# Patient Record
Sex: MALE | Race: WHITE | Employment: OTHER | URBAN - METROPOLITAN AREA
[De-identification: names, ages, dates, MRNs, and addresses within clinical notes are randomized per-mention and may not be internally consistent; named-entity substitution may affect disease eponyms.]

---

## 2017-09-22 ENCOUNTER — OFFICE VISIT (OUTPATIENT)
Dept: URGENT CARE | Facility: PHYSICIAN GROUP | Age: 82
End: 2017-09-22
Payer: MEDICARE

## 2017-09-22 ENCOUNTER — HOSPITAL ENCOUNTER (EMERGENCY)
Facility: MEDICAL CENTER | Age: 82
End: 2017-09-22
Attending: EMERGENCY MEDICINE
Payer: MEDICARE

## 2017-09-22 ENCOUNTER — APPOINTMENT (OUTPATIENT)
Dept: RADIOLOGY | Facility: MEDICAL CENTER | Age: 82
End: 2017-09-22
Attending: EMERGENCY MEDICINE
Payer: MEDICARE

## 2017-09-22 VITALS
OXYGEN SATURATION: 95 % | SYSTOLIC BLOOD PRESSURE: 148 MMHG | HEART RATE: 76 BPM | RESPIRATION RATE: 16 BRPM | DIASTOLIC BLOOD PRESSURE: 70 MMHG | TEMPERATURE: 96.9 F | WEIGHT: 150.57 LBS | HEIGHT: 68 IN | BODY MASS INDEX: 22.82 KG/M2

## 2017-09-22 VITALS
HEIGHT: 68 IN | HEART RATE: 80 BPM | SYSTOLIC BLOOD PRESSURE: 140 MMHG | RESPIRATION RATE: 16 BRPM | BODY MASS INDEX: 22.73 KG/M2 | WEIGHT: 150 LBS | DIASTOLIC BLOOD PRESSURE: 80 MMHG | TEMPERATURE: 97.4 F | OXYGEN SATURATION: 94 %

## 2017-09-22 DIAGNOSIS — F41.8 SITUATIONAL ANXIETY: ICD-10-CM

## 2017-09-22 DIAGNOSIS — S64.40XA DIGITAL NERVE LACERATION, FINGER, INITIAL ENCOUNTER: ICD-10-CM

## 2017-09-22 DIAGNOSIS — S61.213A LACERATION OF LEFT MIDDLE FINGER: ICD-10-CM

## 2017-09-22 DIAGNOSIS — S61.422A LACERATION OF LEFT HAND WITH FOREIGN BODY, INITIAL ENCOUNTER: ICD-10-CM

## 2017-09-22 DIAGNOSIS — S61.211A LACERATION OF LEFT INDEX FINGER: ICD-10-CM

## 2017-09-22 PROCEDURE — 73130 X-RAY EXAM OF HAND: CPT | Mod: LT

## 2017-09-22 PROCEDURE — A9270 NON-COVERED ITEM OR SERVICE: HCPCS | Performed by: EMERGENCY MEDICINE

## 2017-09-22 PROCEDURE — 302874 HCHG BANDAGE ACE 2 OR 3""

## 2017-09-22 PROCEDURE — 99284 EMERGENCY DEPT VISIT MOD MDM: CPT

## 2017-09-22 PROCEDURE — 700102 HCHG RX REV CODE 250 W/ 637 OVERRIDE(OP): Performed by: EMERGENCY MEDICINE

## 2017-09-22 PROCEDURE — 29125 APPL SHORT ARM SPLINT STATIC: CPT

## 2017-09-22 PROCEDURE — 304999 HCHG REPAIR-SIMPLE/INTERMED LEVEL 1

## 2017-09-22 PROCEDURE — 90715 TDAP VACCINE 7 YRS/> IM: CPT | Performed by: EMERGENCY MEDICINE

## 2017-09-22 PROCEDURE — 700101 HCHG RX REV CODE 250: Performed by: EMERGENCY MEDICINE

## 2017-09-22 PROCEDURE — 303747 HCHG EXTRA SUTURE

## 2017-09-22 PROCEDURE — 700111 HCHG RX REV CODE 636 W/ 250 OVERRIDE (IP): Performed by: EMERGENCY MEDICINE

## 2017-09-22 PROCEDURE — 99215 OFFICE O/P EST HI 40 MIN: CPT | Performed by: NURSE PRACTITIONER

## 2017-09-22 PROCEDURE — 90471 IMMUNIZATION ADMIN: CPT

## 2017-09-22 RX ORDER — CEPHALEXIN 500 MG/1
500 CAPSULE ORAL 4 TIMES DAILY
Qty: 28 CAP | Refills: 0 | Status: SHIPPED | OUTPATIENT
Start: 2017-09-22 | End: 2017-09-29

## 2017-09-22 RX ORDER — CEPHALEXIN 500 MG/1
500 CAPSULE ORAL 4 TIMES DAILY
Qty: 28 CAP | Refills: 0 | Status: SHIPPED | OUTPATIENT
Start: 2017-09-22 | End: 2017-09-22

## 2017-09-22 RX ORDER — BACITRACIN ZINC AND POLYMYXIN B SULFATE 500; 10000 [USP'U]/G; [USP'U]/G
OINTMENT OPHTHALMIC
Status: COMPLETED
Start: 2017-09-22 | End: 2017-09-22

## 2017-09-22 RX ORDER — CEPHALEXIN 500 MG/1
500 CAPSULE ORAL ONCE
Status: COMPLETED | OUTPATIENT
Start: 2017-09-22 | End: 2017-09-22

## 2017-09-22 RX ORDER — LIDOCAINE HYDROCHLORIDE 10 MG/ML
20 INJECTION, SOLUTION INFILTRATION; PERINEURAL ONCE
Status: COMPLETED | OUTPATIENT
Start: 2017-09-22 | End: 2017-09-22

## 2017-09-22 RX ADMIN — CEPHALEXIN 500 MG: 500 CAPSULE ORAL at 14:17

## 2017-09-22 RX ADMIN — CLOSTRIDIUM TETANI TOXOID ANTIGEN (FORMALDEHYDE INACTIVATED), CORYNEBACTERIUM DIPHTHERIAE TOXOID ANTIGEN (FORMALDEHYDE INACTIVATED), BORDETELLA PERTUSSIS TOXOID ANTIGEN (GLUTARALDEHYDE INACTIVATED), BORDETELLA PERTUSSIS FILAMENTOUS HEMAGGLUTININ ANTIGEN (FORMALDEHYDE INACTIVATED), BORDETELLA PERTUSSIS PERTACTIN ANTIGEN, AND BORDETELLA PERTUSSIS FIMBRIAE 2/3 ANTIGEN 0.5 ML: 5; 2; 2.5; 5; 3; 5 INJECTION, SUSPENSION INTRAMUSCULAR at 14:16

## 2017-09-22 RX ADMIN — LIDOCAINE HYDROCHLORIDE 20 ML: 10 INJECTION, SOLUTION INFILTRATION; PERINEURAL at 12:59

## 2017-09-22 ASSESSMENT — ENCOUNTER SYMPTOMS
SENSORY CHANGE: 0
MYALGIAS: 1
BRUISES/BLEEDS EASILY: 0
TINGLING: 0
CHILLS: 0
WEAKNESS: 0
FEVER: 0

## 2017-09-22 ASSESSMENT — PAIN SCALES - GENERAL: PAINLEVEL_OUTOF10: 2

## 2017-09-22 NOTE — ED NOTES
Tech applied splint, wrapped stitches. Pt instructed on how to keep clean and look for signs of infection.

## 2017-09-22 NOTE — ED NOTES
Discharge instructions given to pt including follow up w/plastics in 5 days.  Educated to keep splint dry and intact and to check on circulation. Pt able to wiggle fingers.  Questions answered by RN. Prescription for antibiotics given to pt. No new complaints made. Discharged accompanied by wife.

## 2017-09-22 NOTE — ED NOTES
Ambulates to triage  Chief Complaint   Patient presents with   • Facial Laceration     L hand pointer finger, cut it on a piece of sheet metal, tetanus shot UTD

## 2017-09-22 NOTE — PROGRESS NOTES
Subjective:      Jason Manning is a 82 y.o. male who presents with Laceration (L. index and middle finger laceration )            HPI  Jason is a 82 year old male who is here for laceration of left middle and ringer finger today. Wife present. States was cutting sheet metal without gloves. States no blood thinner use. States UTD with tetanus but unable to states when he received it.    PMH:  has no past medical history of ASTHMA; Diabetes; Heart attack; Hypertension; or Stroke.  MEDS:   Current Outpatient Prescriptions:   •  ciprofloxacin (CIPRO) 500 MG Tab, Take 1 Tab by mouth 2 times a day., Disp: 28 Tab, Rfl: 0  •  ergocalciferol (DRISDOL) 38064 UNIT capsule, Take one cap PO three days per week., Disp: 12 Cap, Rfl: 2  •  hydrocodone/acetaminophen (NORCO)  MG Tab, Take 1 Tab by mouth every 6 hours as needed., Disp: 120 Tab, Rfl: 0  ALLERGIES: No Known Allergies  SURGHX:   Past Surgical History:   Procedure Laterality Date   • HERNIA REPAIR       SOCHX:  reports that he has never smoked. He does not have any smokeless tobacco history on file. He reports that he does not drink alcohol.  FH: Family history was reviewed, no pertinent findings to report    Review of Systems   Constitutional: Negative for chills, fever and malaise/fatigue.   Musculoskeletal: Positive for myalgias.   Neurological: Negative for tingling, sensory change and weakness.   Endo/Heme/Allergies: Does not bruise/bleed easily.   All other systems reviewed and are negative.         Objective:     There were no vitals taken for this visit.     Physical Exam   Constitutional: He is oriented to person, place, and time. Vital signs are normal. He appears well-developed and well-nourished. He is active and cooperative.  Non-toxic appearance. He does not have a sickly appearance. He does not appear ill. No distress.   HENT:   Head: Normocephalic.   Eyes: Conjunctivae and EOM are normal. Pupils are equal, round, and reactive to light.   Neck:  Normal range of motion. Neck supple.   Cardiovascular: Normal rate.    Pulmonary/Chest: Effort normal.   Neurological: He is alert and oriented to person, place, and time.   Skin: Skin is warm and dry. Laceration noted. No abrasion, no bruising, no ecchymosis and no rash noted. He is not diaphoretic. No erythema.   Full thickness laceration, clean cut straight across left index finger from medial to lateral aspect of ventral side just below first distal joint. No pulsating blood but has steady stream of blood flow out of laceration, unable to visualize damage. Left middle finger has laceration with same respects to index finger except no active bleeding and not as deep. No redness, swelling or debris seen in wounds. Cleaned with saline and pressure bandage to both fingers.   Vitals reviewed.              Assessment/Plan:     1. Laceration of left hand with foreign body, initial encounter    Refer patient to ER for laceration repair due to unable to stop bleeding with pressure  Patient stable, wife to drive him POV to ER

## 2017-09-22 NOTE — ED PROVIDER NOTES
"ED Provider Note    Scribed for Neal Sanz M.D. by Ellyn Simms. 9/22/2017, 12:40 PM.    Primary care provider: Bel Looney P.A.-C.  Means of arrival: Walk-in  History obtained from: Patient  History limited by: None    CHIEF COMPLAINT  Chief Complaint   Patient presents with   • Facial Laceration     L hand pointer finger, cut it on a piece of sheet metal, tetanus shot UTD       HPI  Jason Manning is a 82 y.o. male who presents to the Emergency Department for left pointer finger laceration onset prior to arrival. The patient reports developing symptoms from a piece of sheet metal. He has associated numbness. He has no past medical history and takes no daily medications. He has no allergy to medications. His tetanus shot is up to date.     REVIEW OF SYSTEMS  Review of Systems   Skin:        Left pointer finger laceration with numbness   E.     PAST MEDICAL HISTORY  The patient has no chronic medical history.     SURGICAL HISTORY   has a past surgical history that includes hernia repair.    SOCIAL HISTORY  Social History   Substance Use Topics   • Smoking status: Never Smoker   • Smokeless tobacco: Never Used   • Alcohol use No      FAMILY HISTORY  Family History   Problem Relation Age of Onset   • Non-contributory Mother    • Non-contributory Father        CURRENT MEDICATIONS  No current facility-administered medications for this encounter.     Current Outpatient Prescriptions:   •  ciprofloxacin (CIPRO) 500 MG Tab, Take 1 Tab by mouth 2 times a day., Disp: 28 Tab, Rfl: 0  •  ergocalciferol (DRISDOL) 65848 UNIT capsule, Take one cap PO three days per week., Disp: 12 Cap, Rfl: 2  •  hydrocodone/acetaminophen (NORCO)  MG Tab, Take 1 Tab by mouth every 6 hours as needed., Disp: 120 Tab, Rfl: 0     ALLERGIES  No Known Allergies    PHYSICAL EXAM  VITAL SIGNS: /77   Pulse 78   Temp 36.1 °C (96.9 °F) (Temporal)   Resp 16   Ht 1.727 m (5' 8\")   Wt 68.3 kg (150 lb 9.2 oz)   SpO2 94%   BMI 22.89 " kg/m²   Vitals reviewed.  Constitutional: Well developed, Well nourished, No acute distress, Non-toxic appearance.   Skin: Warm, Dry.  Left has a laceration of the index finger just distal to the DIP joint.  This is a deep laceration.  On the ulnar aspect of his finger.  He has an intact digital nerve.  I suspected intact Artery.  On the radial aspect of his finger.  There is no sensation distally and he is bleeding.  I suspect he has a digital artery and nerve here.  He has immediate cap refill.  The long finger on the left hand has a laceration that is just proximal to the distal interphalangeal joint.  Normal tendon function.  Normal neurovascular exam.    Both wounds were examined in a bloodless field during closure.  The index finger laceration goes down to the tendon.  Tendon function appears to be intact.  Musculoskeletal: Left pointer finger: distal sensation in ulnar and radial aspect intact    RADIOLOGY  DX-HAND 3+ LEFT   Final Result      Soft tissue swelling left second and third fingers. No acute fracture identified.   Arthritic changes.      The radiologist's interpretation of all radiological studies have been reviewed by me.    Laceration Repair Procedure Note    Indication: Laceration    Procedure: The patient was placed in the appropriate position and The index and long finger were anesthetized with 1% lidocaine without epinephrine using a digital block.  A tourniquet is placed around the base of his left index finger to minimize the bleeding, syncope, examined and closed.  Fingers are prepped with Betadine.  Left index finger board with the above findings.  Laceration is closed with 4 interrupted 4-0 nylon sutures.  The middle finger was also prepped, draped and explored.  This does not track his deep periods close the 3 sutures.  Procedure is tolerated well.  Total repaired wound length: 3 cm.     Other Items: Suture count: 11    The patient tolerated the procedure well.    Complications:  None      COURSE & MEDICAL DECISION MAKING  Pertinent Labs & Imaging studies reviewed. (See chart for details)    12:43 PM Patient seen and examined at bedside. Discussed laceration repair with the patient, which he understands and agrees with. Ordered for DX-hand left to evaluate.     1:14 PM Performed Laceration Repair Procedure, see above note for more details. Wound care was discussed with the patient. The patient will be discharged with Keflex and should return if symptoms worsen or if new symptoms arise. The patient understands and agrees to plan.      Tetanus is up-to-date.  He is prescribed Keflex is referred to the hand surgeon.  I discussed the importance with him.        The patient will return for new or worsening symptoms and is stable at the time of discharge.    The patient is referred to a primary physician for blood pressure management, diabetic screening, and for all other preventative health concerns.    DISPOSITION:  Patient will be discharged home in stable condition.    FOLLOW UP:  Your Physician  Varies    Schedule an appointment as soon as possible for a visit in 2 days        OUTPATIENT MEDICATIONS:  New Prescriptions    CEPHALEXIN (KEFLEX) 500 MG CAP    Take 1 Cap by mouth 4 times a day for 7 days.     FINAL IMPRESSION  1. Laceration of left index finger    2. Laceration of left middle finger    3. Digital nerve laceration, finger, initial encounter    4. Situational anxiety         Ellyn DOOLEY (Scribe), am scribing for, and in the presence of, Neal Sanz M.D..    Electronically signed by: Ellyn Simms (Scribe), 9/22/2017    Neal DOOLEY M.D. personally performed the services described in this documentation, as scribed by Ellyn Simms in my presence, and it is both accurate and complete.    The note accurately reflects work and decisions made by me.  Neal Sanz  9/22/2017  3:20 PM

## 2017-09-22 NOTE — DISCHARGE INSTRUCTIONS
Follow-up with a hand doctor.  Return to ER or to return to the ER for pain, swelling, redness, or other concerns.      Laceration Care, Adult  A laceration is a cut that goes through all layers of the skin. The cut also goes into the tissue that is right under the skin. Some cuts heal on their own. Others need to be closed with stitches (sutures), staples, skin adhesive strips, or wound glue. Taking care of your cut lowers your risk of infection and helps your cut to heal better.  HOW TO TAKE CARE OF YOUR CUT  For stitches or staples:  · Keep the wound clean and dry.  · If you were given a bandage (dressing), you should change it at least one time per day or as told by your doctor. You should also change it if it gets wet or dirty.  · Keep the wound completely dry for the first 24 hours or as told by your doctor. After that time, you may take a shower or a bath. However, make sure that the wound is not soaked in water until after the stitches or staples have been removed.  · Clean the wound one time each day or as told by your doctor:  ¨ Wash the wound with soap and water.  ¨ Rinse the wound with water until all of the soap comes off.  ¨ Pat the wound dry with a clean towel. Do not rub the wound.  · After you clean the wound, put a thin layer of antibiotic ointment on it as told by your doctor. This ointment:  ¨ Helps to prevent infection.  ¨ Keeps the bandage from sticking to the wound.  · Have your stitches or staples removed as told by your doctor.  If your doctor used skin adhesive strips:   · Keep the wound clean and dry.  · If you were given a bandage, you should change it at least one time per day or as told by your doctor. You should also change it if it gets dirty or wet.  · Do not get the skin adhesive strips wet. You can take a shower or a bath, but be careful to keep the wound dry.  · If the wound gets wet, pat it dry with a clean towel. Do not rub the wound.  · Skin adhesive strips fall off on their  own. You can trim the strips as the wound heals. Do not remove any strips that are still stuck to the wound. They will fall off after a while.  If your doctor used wound glue:  · Try to keep your wound dry, but you may briefly wet it in the shower or bath. Do not soak the wound in water, such as by swimming.  · After you take a shower or a bath, gently pat the wound dry with a clean towel. Do not rub the wound.  · Do not do any activities that will make you really sweaty until the skin glue has fallen off on its own.  · Do not apply liquid, cream, or ointment medicine to your wound while the skin glue is still on.  · If you were given a bandage, you should change it at least one time per day or as told by your doctor. You should also change it if it gets dirty or wet.  · If a bandage is placed over the wound, do not let the tape for the bandage touch the skin glue.  · Do not pick at the glue. The skin glue usually stays on for 5-10 days. Then, it falls off of the skin.  General Instructions   · To help prevent scarring, make sure to cover your wound with sunscreen whenever you are outside after stitches are removed, after adhesive strips are removed, or when wound glue stays in place and the wound is healed. Make sure to wear a sunscreen of at least 30 SPF.  · Take over-the-counter and prescription medicines only as told by your doctor.  · If you were given antibiotic medicine or ointment, take or apply it as told by your doctor. Do not stop using the antibiotic even if your wound is getting better.  · Do not scratch or pick at the wound.  · Keep all follow-up visits as told by your doctor. This is important.  · Check your wound every day for signs of infection. Watch for:  ¨ Redness, swelling, or pain.  ¨ Fluid, blood, or pus.  · Raise (elevate) the injured area above the level of your heart while you are sitting or lying down, if possible.  GET HELP IF:  · You got a tetanus shot and you have any of these problems  at the injection site:  ¨ Swelling.  ¨ Very bad pain.  ¨ Redness.  ¨ Bleeding.  · You have a fever.  · A wound that was closed breaks open.  · You notice a bad smell coming from your wound or your bandage.  · You notice something coming out of the wound, such as wood or glass.  · Medicine does not help your pain.  · You have more redness, swelling, or pain at the site of your wound.  · You have fluid, blood, or pus coming from your wound.  · You notice a change in the color of your skin near your wound.  · You need to change the bandage often because fluid, blood, or pus is coming from the wound.  · You start to have a new rash.  · You start to have numbness around the wound.  GET HELP RIGHT AWAY IF:  · You have very bad swelling around the wound.  · Your pain suddenly gets worse and is very bad.  · You notice painful lumps near the wound or on skin that is anywhere on your body.  · You have a red streak going away from your wound.  · The wound is on your hand or foot and you cannot move a finger or toe like you usually can.  · The wound is on your hand or foot and you notice that your fingers or toes look pale or bluish.     This information is not intended to replace advice given to you by your health care provider. Make sure you discuss any questions you have with your health care provider.     Document Released: 06/05/2009 Document Revised: 05/03/2016 Document Reviewed: 12/14/2015  [employer] Interactive Patient Education ©2016 [employer]

## 2017-10-09 ENCOUNTER — OFFICE VISIT (OUTPATIENT)
Dept: URGENT CARE | Facility: PHYSICIAN GROUP | Age: 82
End: 2017-10-09
Payer: MEDICARE

## 2017-10-09 VITALS
OXYGEN SATURATION: 95 % | TEMPERATURE: 97.4 F | SYSTOLIC BLOOD PRESSURE: 140 MMHG | DIASTOLIC BLOOD PRESSURE: 90 MMHG | HEART RATE: 79 BPM | BODY MASS INDEX: 22.73 KG/M2 | WEIGHT: 150 LBS | HEIGHT: 68 IN | RESPIRATION RATE: 16 BRPM

## 2017-10-09 DIAGNOSIS — J30.2 ACUTE SEASONAL ALLERGIC RHINITIS, UNSPECIFIED TRIGGER: ICD-10-CM

## 2017-10-09 PROCEDURE — 99214 OFFICE O/P EST MOD 30 MIN: CPT | Performed by: PHYSICIAN ASSISTANT

## 2017-10-09 RX ORDER — TRIAMCINOLONE ACETONIDE 40 MG/ML
40 INJECTION, SUSPENSION INTRA-ARTICULAR; INTRAMUSCULAR ONCE
Status: COMPLETED | OUTPATIENT
Start: 2017-10-09 | End: 2017-10-09

## 2017-10-09 RX ADMIN — TRIAMCINOLONE ACETONIDE 40 MG: 40 INJECTION, SUSPENSION INTRA-ARTICULAR; INTRAMUSCULAR at 12:38

## 2017-10-09 ASSESSMENT — ENCOUNTER SYMPTOMS
SORE THROAT: 0
EYE REDNESS: 0
MYALGIAS: 0
NECK PAIN: 0
WHEEZING: 0
FEVER: 0
EYE DISCHARGE: 0
COUGH: 0
SINUS PRESSURE: 1
DIAPHORESIS: 0
SHORTNESS OF BREATH: 0
SWOLLEN GLANDS: 0
CHILLS: 0
HEADACHES: 1

## 2017-10-09 NOTE — LETTER
October 9, 2017         Patient: Jason Manning   YOB: 1935   Date of Visit: 10/9/2017       Kenalog Intramuscular Injection Consent Form    I am currently not diagnosed with any of the following disorders...    Hypothyroidism, ocular herpes simplex, myasthenia gravis, diverticulitis, ulcerative colitis, skin abscesses, predisposition to thrombophlebitis, peptic ulceration, epilepsy, renal insufficiency, fresh intestinal anastomoses, liver failure or cirrhosis, diabetes mellitus, glaucoma, tuberculosis, previous corticosteroid induced myopathy or psychosis,  Other severe affective disorders, hypertension, congestive heart failure,   Osteoporosis.    ____________  Patient initials      Adverse effects:    Neuropsychiatric  --mood swings, depression, insomnia, personality changes, psychosis, seizures    Anti-inflammatory and immno-suppressive effects  --increased susceptibility and severity of infections with suppression of clinical signs and symptoms, recurrence of dormant tuberculosis, opportunistic infections and may suppress reactions to skin tests.      Gastor-intestinal  --Dyspepsia, peptic ulceration with perforation and hemorrhage, abdominal distention, oesphageal ulceration, candidiasis, perforation of bowel and acute pancreatitis.  --Small, reversible rises liver function labs have been observed following corticosteroid administration.    Fluid & Electrolyte balance  --Sodium and water retention, potassium loss, alkalosis, hypotension, and congestive heart failure in susceptible patients.    Dermatological  --Impaired healing, thin fragile skin, skin atrophy, bruising     Endocrine/metabolic  --Suppression of the adrenal system, growth suppression in infancy, childhood and adolescence, cushingoid facial features, weight gain, hirsutism, impaired carbohydrate tolerance with increased requirements for antidiabetic therapy, menstrual irregularities, and amenorrhea, negative nitrogen and calcium  balance and increased appetite.    Musculoskelatal  --Muscle weakness, proximal myopathy, osteoporosis, avascular necrosis, especially of femoral head, tendon rupture and aseptic necrosis        Opthalmic  --cataracts with possible damage to the optic nerve, increased intra-ocular pressure and glaucoma, corneal or scleral thinning, and exacerbation of opthalmic viral or fungal disease    General  --hypersensitivity including anaphylaxis, nausea, vertigo, thromboembolism and leucocytosis  --allergic or anaphylactic reactions, cutaneous and subcutaneous atrophy, sterile abscess, post-injection flare following intra-articular use, hypo or hyper-pigmentation  --suppression of the inflammatory response and immune function increases the susceptibiltiy to fungal, bacterial or viral infections and their severity.  Corticosteroids may mask some signs of infection.  You may become severely ill before  Diagnosis is made.     I have discussed the above side effects with the physician and agree to receive Kenalog.      _______________________________________________________________signature          Date    ________________________________________________________________________  Physician signature                King Bruce P.A.-C.  Electronically Signed

## 2017-10-09 NOTE — PROGRESS NOTES
"Subjective:      Jason Manning is a 82 y.o. male who presents with Allergic Rhinitis (x 1 week)            Pt is 83 y/o male who presents with congestion, itchy eyes, runny nose, sneezing, and watery itchy eyes.   He reports when he \"Gets this bad\" in the past he was given some type of \"injection\" and this greatly helped his symptoms.       Sinus Problem   This is a recurrent problem. The current episode started 1 to 4 weeks ago. The problem has been gradually worsening since onset. There has been no fever. His pain is at a severity of 2/10. The pain is mild. Associated symptoms include congestion, ear pain, headaches, sinus pressure and sneezing. Pertinent negatives include no chills, coughing, diaphoresis, neck pain, shortness of breath, sore throat or swollen glands. Treatments tried: OTC allergy meds, and eye drops.  The treatment provided no relief.       Review of Systems   Constitutional: Positive for malaise/fatigue. Negative for chills, diaphoresis and fever.   HENT: Positive for congestion, ear pain, sinus pressure and sneezing. Negative for ear discharge and sore throat.         Pos. For ear pressure     Eyes: Negative for discharge and redness.   Respiratory: Negative for cough, shortness of breath and wheezing.    Cardiovascular: Negative for chest pain and leg swelling.   Genitourinary: Negative for dysuria and urgency.   Musculoskeletal: Negative for myalgias and neck pain.   Skin: Negative for itching and rash.   Neurological: Positive for headaches.          Objective:     /90   Pulse 79   Temp 36.3 °C (97.4 °F)   Resp 16   Ht 1.727 m (5' 8\")   Wt 68 kg (150 lb)   SpO2 95%   BMI 22.81 kg/m²    PMH:  has no past medical history of ASTHMA; Diabetes; Heart attack; Hypertension; or Stroke (CMS-[facility]).  MEDS:   Current Outpatient Prescriptions:   •  ciprofloxacin (CIPRO) 500 MG Tab, Take 1 Tab by mouth 2 times a day., Disp: 28 Tab, Rfl: 0  •  ergocalciferol (DRISDOL) 17911 UNIT capsule, Take " one cap PO three days per week., Disp: 12 Cap, Rfl: 2  •  hydrocodone/acetaminophen (NORCO)  MG Tab, Take 1 Tab by mouth every 6 hours as needed., Disp: 120 Tab, Rfl: 0    Current Facility-Administered Medications:   •  triamcinolone acetonide (KENALOG-40) injection 40 mg, 40 mg, Intramuscular, Once, AV [id number]-JORDY  ALLERGIES: No Known Allergies  SURGHX:   Past Surgical History:   Procedure Laterality Date   • HERNIA REPAIR       SOCHX:  reports that he has never smoked. He has never used smokeless tobacco. He reports that he does not drink alcohol.  FH: Family history was reviewed, no pertinent findings to report    Physical Exam   Constitutional: He is oriented to person, place, and time. He appears well-developed and well-nourished.   HENT:   Head: Normocephalic and atraumatic.   Mouth/Throat: No oropharyngeal exudate.   Bilateral clear effusions- without bulge or erythema to the TM.   Posterior oropharynx with pos. PND without tonsillar edema or erythema.   Nose- boggy turbinates with mild-moderate amount of nasal discharge. Bilateral maxillary sinuses without tenderness.    Eyes: EOM are normal. Pupils are equal, round, and reactive to light. Right conjunctiva is injected. Left conjunctiva is injected.   Neck: Normal range of motion. Neck supple.   Cardiovascular: Normal rate and regular rhythm.    Pulmonary/Chest: Effort normal and breath sounds normal. No respiratory distress. He has no wheezes.   Musculoskeletal: Normal range of motion. He exhibits no edema.   Lymphadenopathy:     He has no cervical adenopathy.   Neurological: He is alert and oriented to person, place, and time.   Skin: Skin is warm. No rash noted.   Psychiatric: He has a normal mood and affect. His behavior is normal.   Vitals reviewed.              Assessment/Plan:     1. Acute seasonal allergic rhinitis, unspecified trigger  - triamcinolone acetonide (KENALOG-40) injection 40 mg; 1 mL by Intramuscular route Once.    As  patient has failed over the counter formulations- without hx of DMII- glaucoma, ect.   Reviewed side effects and potential adverse effects- please see scanned document- Kenalog was given today.   RTC PRN.

## 2018-01-19 ENCOUNTER — APPOINTMENT (OUTPATIENT)
Dept: RADIOLOGY | Facility: IMAGING CENTER | Age: 83
End: 2018-01-19
Attending: NURSE PRACTITIONER
Payer: MEDICARE

## 2018-01-19 ENCOUNTER — OFFICE VISIT (OUTPATIENT)
Dept: URGENT CARE | Facility: PHYSICIAN GROUP | Age: 83
End: 2018-01-19
Payer: MEDICARE

## 2018-01-19 VITALS
TEMPERATURE: 98.5 F | BODY MASS INDEX: 22.84 KG/M2 | WEIGHT: 150.2 LBS | RESPIRATION RATE: 12 BRPM | DIASTOLIC BLOOD PRESSURE: 84 MMHG | OXYGEN SATURATION: 95 % | SYSTOLIC BLOOD PRESSURE: 130 MMHG | HEART RATE: 80 BPM

## 2018-01-19 DIAGNOSIS — R05.8 PRODUCTIVE COUGH: ICD-10-CM

## 2018-01-19 DIAGNOSIS — M54.9 UPPER BACK PAIN ON RIGHT SIDE: ICD-10-CM

## 2018-01-19 DIAGNOSIS — J34.89 NASAL CONGESTION WITH RHINORRHEA: ICD-10-CM

## 2018-01-19 DIAGNOSIS — R09.81 NASAL CONGESTION WITH RHINORRHEA: ICD-10-CM

## 2018-01-19 DIAGNOSIS — J06.9 URI WITH COUGH AND CONGESTION: ICD-10-CM

## 2018-01-19 DIAGNOSIS — J02.9 SORE THROAT: ICD-10-CM

## 2018-01-19 LAB
INT CON NEG: NEGATIVE
INT CON POS: POSITIVE
S PYO AG THROAT QL: NEGATIVE

## 2018-01-19 PROCEDURE — 87880 STREP A ASSAY W/OPTIC: CPT | Performed by: NURSE PRACTITIONER

## 2018-01-19 PROCEDURE — 71046 X-RAY EXAM CHEST 2 VIEWS: CPT | Mod: TC | Performed by: NURSE PRACTITIONER

## 2018-01-19 PROCEDURE — 99214 OFFICE O/P EST MOD 30 MIN: CPT | Performed by: NURSE PRACTITIONER

## 2018-01-19 RX ORDER — AZITHROMYCIN 250 MG/1
TABLET, FILM COATED ORAL
Qty: 6 TAB | Refills: 0 | Status: SHIPPED | OUTPATIENT
Start: 2018-01-19 | End: 2018-09-24

## 2018-01-19 RX ORDER — MELOXICAM 7.5 MG/1
7.5 TABLET ORAL DAILY
Qty: 7 TAB | Refills: 0 | Status: SHIPPED | OUTPATIENT
Start: 2018-01-19

## 2018-01-19 RX ORDER — FLUTICASONE PROPIONATE 50 MCG
2 SPRAY, SUSPENSION (ML) NASAL DAILY
Qty: 1 BOTTLE | Refills: 0 | Status: SHIPPED | OUTPATIENT
Start: 2018-01-19 | End: 2018-09-24

## 2018-01-19 RX ORDER — DOXYCYCLINE HYCLATE 100 MG
100 TABLET ORAL 2 TIMES DAILY
Qty: 14 TAB | Refills: 0 | Status: CANCELLED | OUTPATIENT
Start: 2018-01-19 | End: 2018-01-26

## 2018-01-19 ASSESSMENT — ENCOUNTER SYMPTOMS
DIZZINESS: 0
PALPITATIONS: 0
CHILLS: 0
SENSORY CHANGE: 0
TINGLING: 0
WEAKNESS: 0
COUGH: 1
BACK PAIN: 1
EYE DISCHARGE: 0
FEVER: 0
MYALGIAS: 1
SINUS PAIN: 0
WHEEZING: 0
NECK PAIN: 0
SHORTNESS OF BREATH: 0
EYE REDNESS: 0
ORTHOPNEA: 0
HEADACHES: 0
SPUTUM PRODUCTION: 1
SORE THROAT: 1

## 2018-01-19 NOTE — PROGRESS NOTES
"Subjective:      Jason Manning is a 82 y.o. male who presents with Pharyngitis (coughing up yellow substance, burning in throat, buzzing in ear, fever x1 day)            HPI  Jason is here for \"burning\" sore throat, nasal congestion, sinus pressure/ear pressure, cough yellow phlegm, denies SOB, chest tightness or wheeze. No OTC meds. C/o right upper back pain with cough but not on deep breathing. Pain with movement.    PMH:  has no past medical history of ASTHMA; Diabetes; Heart attack; Hypertension; or Stroke ([facility]).  MEDS:   Current Outpatient Prescriptions:   •  ciprofloxacin (CIPRO) 500 MG Tab, Take 1 Tab by mouth 2 times a day., Disp: 28 Tab, Rfl: 0  •  ergocalciferol (DRISDOL) 04631 UNIT capsule, Take one cap PO three days per week., Disp: 12 Cap, Rfl: 2  •  hydrocodone/acetaminophen (NORCO)  MG Tab, Take 1 Tab by mouth every 6 hours as needed., Disp: 120 Tab, Rfl: 0  ALLERGIES: No Known Allergies  SURGHX:   Past Surgical History:   Procedure Laterality Date   • HERNIA REPAIR       SOCHX:  reports that he has never smoked. He has never used smokeless tobacco. He reports that he does not drink alcohol or use drugs.  FH: Family history was reviewed, no pertinent findings to report    Review of Systems   Constitutional: Positive for malaise/fatigue. Negative for chills and fever.   HENT: Positive for congestion, ear pain and sore throat. Negative for sinus pain.    Eyes: Negative for discharge and redness.   Respiratory: Positive for cough and sputum production. Negative for shortness of breath and wheezing.    Cardiovascular: Negative for chest pain, palpitations and orthopnea.   Musculoskeletal: Positive for back pain and myalgias. Negative for neck pain.   Neurological: Negative for dizziness, tingling, sensory change, weakness and headaches.   Endo/Heme/Allergies: Negative for environmental allergies.   All other systems reviewed and are negative.         Objective:     /84   Pulse 80   " Temp 36.9 °C (98.5 °F)   Resp 12   Wt 68.1 kg (150 lb 3.2 oz)   SpO2 95%   BMI 22.84 kg/m²      Physical Exam   Constitutional: He is oriented to person, place, and time. He appears well-developed and well-nourished. He is active and cooperative.  Non-toxic appearance. He does not have a sickly appearance. He does not appear ill. No distress.   HENT:   Head: Normocephalic.   Right Ear: External ear and ear canal normal. Tympanic membrane is injected.   Left Ear: External ear and ear canal normal. Tympanic membrane is injected.   Nose: Mucosal edema and rhinorrhea present. No sinus tenderness.   Mouth/Throat: Uvula is midline. Mucous membranes are dry. No uvula swelling. Posterior oropharyngeal erythema present. No posterior oropharyngeal edema.   Eyes: Conjunctivae and EOM are normal. Pupils are equal, round, and reactive to light.   Neck: Normal range of motion. Neck supple.   Cardiovascular: Normal rate and regular rhythm.    Pulmonary/Chest: Effort normal and breath sounds normal. No accessory muscle usage. No respiratory distress. He has no decreased breath sounds. He has no wheezes. He has no rhonchi. He has no rales.   Musculoskeletal: Normal range of motion.   Lymphadenopathy:     He has no cervical adenopathy.   Neurological: He is alert and oriented to person, place, and time.   Skin: Skin is warm and dry. He is not diaphoretic.   Vitals reviewed.         CXR:   FINDINGS:  No pulmonary infiltrates or consolidations are noted.  No pleural effusions, no pneumothorax are appreciated.  Normal cardiopericardial silhouette.  Assessment/Plan:     1. Sore throat    - POCT Rapid Strep A: NEG    2. Productive cough    - DX-CHEST-2 VIEWS; Future    3. Nasal congestion with rhinorrhea    - fluticasone (FLONASE) 50 MCG/ACT nasal spray; Spray 2 Sprays in nose every day.  Dispense: 1 Bottle; Refill: 0    4. URI with cough and congestion    - azithromycin (ZITHROMAX) 250 MG Tab; Take 2 tabs by mouth on day 1, then take  1 tab on days 2-5  Dispense: 6 Tab; Refill: 0    5. Upper back pain on right side    - meloxicam (MOBIC) 7.5 MG Tab; Take 1 Tab by mouth every day.  Dispense: 7 Tab; Refill: 0    Increase water intake  May use Tylenol prn for any fever, body aches or throat pain  May take long acting antihistamine for seasonal allergy symptoms prn  May use saline nasal spray/lissa pot for nasal congestion prn  May use throat lozenges for throat discomfort  May gargle with salt water prn for throat discomfort  May drink smoothies for nutrition if too painful to swallow solid foods  Monitor for continued fever with treatment, any sinus pain/pressure with sinus congestion with thick mucus production and HA- need re-evaluation  Monitor for productive cough, SOB and chest pain/tightness, fever- need re-evaluation    May use cool compresses for swelling and warm compresses for muscle stiffness   May perform muscle stretches as tolerated after warm compresses to maintain mobility, avoid abdominal crunches  May use warm heat pad to muscle pain prn  May apply topical analgesics prn  Perform proper body mechanics with lifting, twisting, bending and reaching. Ask for assistance with heavy objects prn  Monitor for numbness/tingling in upper extremities, decreased ROM with lifting difficulty- need re-evaluation

## 2018-05-30 ENCOUNTER — APPOINTMENT (OUTPATIENT)
Dept: RADIOLOGY | Facility: MEDICAL CENTER | Age: 83
End: 2018-05-30
Attending: EMERGENCY MEDICINE
Payer: MEDICARE

## 2018-05-30 ENCOUNTER — HOSPITAL ENCOUNTER (EMERGENCY)
Facility: MEDICAL CENTER | Age: 83
End: 2018-05-30
Attending: EMERGENCY MEDICINE
Payer: MEDICARE

## 2018-05-30 VITALS
HEART RATE: 74 BPM | TEMPERATURE: 98.6 F | WEIGHT: 147.71 LBS | RESPIRATION RATE: 16 BRPM | BODY MASS INDEX: 23.74 KG/M2 | HEIGHT: 66 IN | DIASTOLIC BLOOD PRESSURE: 68 MMHG | SYSTOLIC BLOOD PRESSURE: 114 MMHG | OXYGEN SATURATION: 95 %

## 2018-05-30 DIAGNOSIS — S61.312A LACERATION OF RIGHT MIDDLE FINGER WITHOUT FOREIGN BODY WITH DAMAGE TO NAIL, INITIAL ENCOUNTER: ICD-10-CM

## 2018-05-30 PROCEDURE — 304999 HCHG REPAIR-SIMPLE/INTERMED LEVEL 1

## 2018-05-30 PROCEDURE — 303747 HCHG EXTRA SUTURE

## 2018-05-30 PROCEDURE — 304217 HCHG IRRIGATION SYSTEM

## 2018-05-30 PROCEDURE — 73140 X-RAY EXAM OF FINGER(S): CPT | Mod: RT

## 2018-05-30 PROCEDURE — 99283 EMERGENCY DEPT VISIT LOW MDM: CPT

## 2018-05-30 RX ORDER — CEPHALEXIN 500 MG/1
500 CAPSULE ORAL 4 TIMES DAILY
Qty: 40 CAP | Refills: 0 | Status: SHIPPED | OUTPATIENT
Start: 2018-05-30 | End: 2018-06-09

## 2018-05-30 ASSESSMENT — LIFESTYLE VARIABLES: DO YOU DRINK ALCOHOL: NO

## 2018-05-30 NOTE — ED NOTES
Wound cleaned, abx ointment applied and dressed. Pt states understanding of dc instructions and f/u care.

## 2018-05-30 NOTE — ED TRIAGE NOTES
"Chief Complaint   Patient presents with   • Laceration     R middle finger   Ambulatory to triage. Reports cutting finger on metal at home. Bleeding controlled. Thinks tetanus is UTD.    /68   Pulse 74   Temp 37 °C (98.6 °F)   Resp 16   Ht 1.676 m (5' 6\")   Wt 67 kg (147 lb 11.3 oz)   SpO2 95%   BMI 23.84 kg/m²     Pt Informed regarding triage process and verbalized understanding to inform triage tech or RN for any changes in condition.  Placed in lobby.    "

## 2018-05-30 NOTE — ED PROVIDER NOTES
"ED Provider Note    CHIEF COMPLAINT  Chief Complaint   Patient presents with   • Laceration     R middle finger       HPI  Jason Manning is a 83 y.o. male who presents with a laceration to his right third phalanx. The patient was working on his trailer when he inadvertently cut himself with some sheet metal to the tip of the right third phalanx. It did go through the nail bed. The patient does not have any loss of function with flexion or extension at the DIP joint. He does have significant pain to the distal aspect of the finger where he has the laceration. The patient states his tetanus is up-to-date.    REVIEW OF SYSTEMS  No other muscular skeletal complaints    PHYSICAL EXAM  VITAL SIGNS: /68   Pulse 74   Temp 37 °C (98.6 °F)   Resp 16   Ht 1.676 m (5' 6\")   Wt 67 kg (147 lb 11.3 oz)   SpO2 95%   BMI 23.84 kg/m²   In general the patient does not appear toxic  Extremities the patient does not have any skeletal deformities. He does have a laceration through the distal aspect of the nail plate of the right third phalanx extending to the palmar aspect. He does not have any loss of flexion or extension at the IP joints. The patient has an otherwise normal right hand exam.  Skin 1 centimeter laceration through the tip of the right third phalanx as described above  Neurovascular examination is intact to the right hand    RADIOLOGY/  X-ray shows a fracture to the distal tip of the phalanx    PROCEDURES laceration repair  A digital block was performed with lidocaine. The right middle phalanx was then irrigated profusely with approximately a liter of saline. I then placed 3 4-0 Ethilon sutures. Primary closure. One suture did go through the nail bed.  COURSE & MEDICAL DECISION MAKING  Pertinent Labs & Imaging studies reviewed. (See chart for details)  This an 83-year-old gentleman who presents with a partial\" to the right third phalanx. This was distally through the nailbed and primary closure was performed. " The patient does have evidence of involvement of the distal phalanx and he did have copious irrigation. The patient will be discharged home on Keflex prophylactically. He will change the dressing in 24 hours and keep the aluminum splint in place. He will follow up with the hand surgeon Dr. Fitch for repeat exam in 48 hours.    FINAL IMPRESSION  1. Partial amputation of the distal tip of the right third phalanx with involvement of the distal phalanx as well as the nailbed       Disposition  The patient will be discharged in stable condition      Electronically signed by: Rayshawn Pavon, 5/30/2018 3:31 PM

## 2018-09-24 ENCOUNTER — OFFICE VISIT (OUTPATIENT)
Dept: URGENT CARE | Facility: PHYSICIAN GROUP | Age: 83
End: 2018-09-24
Payer: MEDICARE

## 2018-09-24 VITALS
SYSTOLIC BLOOD PRESSURE: 106 MMHG | TEMPERATURE: 98.6 F | HEART RATE: 87 BPM | HEIGHT: 68 IN | DIASTOLIC BLOOD PRESSURE: 64 MMHG | WEIGHT: 148 LBS | BODY MASS INDEX: 22.43 KG/M2 | OXYGEN SATURATION: 94 %

## 2018-09-24 DIAGNOSIS — J06.9 ACUTE URI: ICD-10-CM

## 2018-09-24 DIAGNOSIS — R05.9 COUGH: ICD-10-CM

## 2018-09-24 PROCEDURE — 99214 OFFICE O/P EST MOD 30 MIN: CPT | Performed by: PHYSICIAN ASSISTANT

## 2018-09-24 RX ORDER — FLUTICASONE PROPIONATE 50 MCG
1 SPRAY, SUSPENSION (ML) NASAL 2 TIMES DAILY
Qty: 16 G | Refills: 0 | Status: SHIPPED | OUTPATIENT
Start: 2018-09-24

## 2018-09-24 RX ORDER — AZITHROMYCIN 250 MG/1
TABLET, FILM COATED ORAL
Qty: 6 TAB | Refills: 0 | Status: SHIPPED | OUTPATIENT
Start: 2018-09-26

## 2018-09-24 NOTE — PROGRESS NOTES
"Chief Complaint   Patient presents with   • Sinus Problem     Sinus congestion, runny nose, headache, sinus pressure, ear pain - onset yesterday        HISTORY OF PRESENT ILLNESS: Patient is a 83 y.o. male who presents today for about 36 hours of sinus congestion/pressure/bilateral ear pain (right > left)   He has been coughing, coughed up some yellow mucus intermittently last night, concerned about infection in the lungs.  Tactile fevers on and off.  No attempted interventions.  Did not take anything OTC today. Currently afebrile.   No chest pain or pain with breathing. Does not feel SOB.   Never smoker.  Does not believe he has ever had pneumonia.      There are no active problems to display for this patient.      Allergies:Patient has no known allergies.    Current Outpatient Prescriptions Ordered in Somewhere   Medication Sig Dispense Refill   • meloxicam (MOBIC) 7.5 MG Tab Take 1 Tab by mouth every day. 7 Tab 0   • ergocalciferol (DRISDOL) 61047 UNIT capsule Take one cap PO three days per week. 12 Cap 2     No current Epic-ordered facility-administered medications on file.        No past medical history on file.    Social History   Substance Use Topics   • Smoking status: Never Smoker   • Smokeless tobacco: Never Used   • Alcohol use No       Family Status   Relation Status   • Mo (Not Specified)   • Fa (Not Specified)     Family History   Problem Relation Age of Onset   • Non-contributory Mother    • Non-contributory Father        ROS:  Review of Systems   Constitutional: SEE HPI  HENT: SEE HPI  Eyes: Negative for blurred vision.   Respiratory: SEE HPI  Cardiovascular: Negative for chest pain, palpitations, orthopnea and leg swelling.   Gastrointestinal: Negative for heartburn, nausea, vomiting and abdominal pain.   Genitourinary: Negative for dysuria, urgency and frequency.     Exam:  Blood pressure 106/64, pulse 87, temperature 37 °C (98.6 °F), temperature source Temporal, height 1.727 m (5' 8\"), weight 67.1 kg " (148 lb), SpO2 94 %.  General:  Well nourished, well developed male in NAD  Eyes: PERRLA, EOM within normal limits, no conjunctival injection, no scleral icterus, visual fields and acuity grossly intact.  Ears: Normal shape and symmetry, no tenderness, no discharge. External canals are without any significant edema or erythema. Tympanic membranes are without any inflammation, no effusion. Gross auditory acuity is intact  Nose: Symmetrical, sinuses without tenderness, copious clear nasal drainage.   Mouth: reasonable hygiene, no erythema exudates or tonsillar enlargement.  Neck: no masses, range of motion within normal limits, no tracheal deviation. No lymphadenopathy  Pulmonary: Normal respiratory effort, no wheezes, crackles, or rhonchi.  Cardiovascular: regular rate and rhythm without murmurs, rubs, or gallops.  Skin: No visible rashes or lesion. Warm, pink, dry.   Extremities: no clubbing, cyanosis, or edema.  Neuro: A&O x 3. Speech normal/clear.  Normal gait.         Assessment/Plan:  1. Cough  fluticasone (FLONASE ALLERGY RELIEF) 50 MCG/ACT nasal spray    azithromycin (ZITHROMAX) 250 MG Tab   2. Acute URI           -sinus care discussed in detail.   Advise saline irrigation/Flonase trial.   -humidifier/steamy showers/fluids/rest.   -discussed most likely self limited viral and I expect improvement in next 3-4 days.   -contingent antibiotic prescription given to patient to fill upon meeting criteria of guidelines discussed. Patient very concerned about cough.        Supportive care, differential diagnoses, and indications for immediate follow-up discussed with patient.   Pathogenesis of diagnosis discussed including typical length and natural progression.   Instructed to return to clinic or nearest emergency department for any change in condition, further concerns, or worsening of symptoms.  Patient states understanding of the plan of care and discharge instructions.        Elizabeth Allen P.A.-C.

## 2019-06-19 ENCOUNTER — HOSPITAL ENCOUNTER (OUTPATIENT)
Dept: HOSPITAL 8 - RAD | Age: 84
Discharge: HOME | End: 2019-06-19
Attending: FAMILY MEDICINE
Payer: COMMERCIAL

## 2019-06-19 DIAGNOSIS — R13.10: Primary | ICD-10-CM

## 2019-06-19 PROCEDURE — 74230 X-RAY XM SWLNG FUNCJ C+: CPT

## 2021-01-15 DIAGNOSIS — Z23 NEED FOR VACCINATION: ICD-10-CM

## 2021-08-19 ENCOUNTER — HOSPITAL ENCOUNTER (EMERGENCY)
Dept: HOSPITAL 8 - ED | Age: 86
Discharge: HOME | End: 2021-08-19
Payer: MEDICARE

## 2021-08-19 VITALS — HEIGHT: 77 IN | BODY MASS INDEX: 17.7 KG/M2 | WEIGHT: 149.91 LBS

## 2021-08-19 VITALS — DIASTOLIC BLOOD PRESSURE: 74 MMHG | SYSTOLIC BLOOD PRESSURE: 106 MMHG

## 2021-08-19 DIAGNOSIS — R33.8: ICD-10-CM

## 2021-08-19 DIAGNOSIS — N40.1: Primary | ICD-10-CM

## 2021-08-19 LAB
ANION GAP SERPL CALC-SCNC: 8 MMOL/L (ref 5–15)
BASOPHILS # BLD AUTO: 0 X10^3/UL (ref 0–0.1)
BASOPHILS NFR BLD AUTO: 0 % (ref 0–1)
CALCIUM SERPL-MCNC: 10.5 MG/DL (ref 8.5–10.1)
CHLORIDE SERPL-SCNC: 103 MMOL/L (ref 98–107)
CREAT SERPL-MCNC: 1.01 MG/DL (ref 0.7–1.3)
EOSINOPHIL # BLD AUTO: 0.1 X10^3/UL (ref 0–0.4)
EOSINOPHIL NFR BLD AUTO: 1 % (ref 1–7)
ERYTHROCYTE [DISTWIDTH] IN BLOOD BY AUTOMATED COUNT: 13.6 % (ref 9.4–14.8)
LYMPHOCYTES # BLD AUTO: 0.7 X10^3/UL (ref 1–3.4)
LYMPHOCYTES NFR BLD AUTO: 10 % (ref 22–44)
MCH RBC QN AUTO: 31.3 PG (ref 27.5–34.5)
MCHC RBC AUTO-ENTMCNC: 34.2 G/DL (ref 33.2–36.2)
MICROSCOPIC: (no result)
MONOCYTES # BLD AUTO: 0.3 X10^3/UL (ref 0.2–0.8)
MONOCYTES NFR BLD AUTO: 4 % (ref 2–9)
NEUTROPHILS # BLD AUTO: 5.9 X10^3/UL (ref 1.8–6.8)
NEUTROPHILS NFR BLD AUTO: 86 % (ref 42–75)
PLATELET # BLD AUTO: 187 X10^3/UL (ref 130–400)
PMV BLD AUTO: 8.1 FL (ref 7.4–10.4)
RBC # BLD AUTO: 4.96 X10^6/UL (ref 4.38–5.82)

## 2021-08-19 PROCEDURE — 76770 US EXAM ABDO BACK WALL COMP: CPT

## 2021-08-19 PROCEDURE — 51702 INSERT TEMP BLADDER CATH: CPT

## 2021-08-19 PROCEDURE — 80048 BASIC METABOLIC PNL TOTAL CA: CPT

## 2021-08-19 PROCEDURE — 85025 COMPLETE CBC W/AUTO DIFF WBC: CPT

## 2021-08-19 PROCEDURE — 36415 COLL VENOUS BLD VENIPUNCTURE: CPT

## 2021-08-19 PROCEDURE — 99284 EMERGENCY DEPT VISIT MOD MDM: CPT

## 2021-08-19 PROCEDURE — 81001 URINALYSIS AUTO W/SCOPE: CPT

## 2021-08-26 ENCOUNTER — HOSPITAL ENCOUNTER (EMERGENCY)
Dept: HOSPITAL 8 - ED | Age: 86
Discharge: HOME | End: 2021-08-26
Payer: MEDICARE

## 2021-08-26 VITALS — SYSTOLIC BLOOD PRESSURE: 130 MMHG | DIASTOLIC BLOOD PRESSURE: 77 MMHG

## 2021-08-26 VITALS — BODY MASS INDEX: 22.11 KG/M2 | WEIGHT: 140.88 LBS | HEIGHT: 67 IN

## 2021-08-26 DIAGNOSIS — N40.0: Primary | ICD-10-CM

## 2021-08-26 DIAGNOSIS — R31.0: ICD-10-CM

## 2021-08-26 LAB
ALBUMIN SERPL-MCNC: 3.2 G/DL (ref 3.4–5)
ALP SERPL-CCNC: 85 U/L (ref 45–117)
ALT SERPL-CCNC: 19 U/L (ref 12–78)
ANION GAP SERPL CALC-SCNC: 4 MMOL/L (ref 5–15)
APTT BLD: 29 SECONDS (ref 25–31)
BASOPHILS # BLD AUTO: 0 X10^3/UL (ref 0–0.1)
BASOPHILS NFR BLD AUTO: 0 % (ref 0–1)
BILIRUB SERPL-MCNC: 0.5 MG/DL (ref 0.2–1)
CALCIUM SERPL-MCNC: 9.5 MG/DL (ref 8.5–10.1)
CHLORIDE SERPL-SCNC: 105 MMOL/L (ref 98–107)
CREAT SERPL-MCNC: 1.13 MG/DL (ref 0.7–1.3)
EOSINOPHIL # BLD AUTO: 0.3 X10^3/UL (ref 0–0.4)
EOSINOPHIL NFR BLD AUTO: 5 % (ref 1–7)
ERYTHROCYTE [DISTWIDTH] IN BLOOD BY AUTOMATED COUNT: 13.6 % (ref 9.4–14.8)
INR PPP: 0.98 (ref 0.93–1.1)
LYMPHOCYTES # BLD AUTO: 1.1 X10^3/UL (ref 1–3.4)
LYMPHOCYTES NFR BLD AUTO: 20 % (ref 22–44)
MCH RBC QN AUTO: 31.3 PG (ref 27.5–34.5)
MCHC RBC AUTO-ENTMCNC: 34.3 G/DL (ref 33.2–36.2)
MONOCYTES # BLD AUTO: 0.4 X10^3/UL (ref 0.2–0.8)
MONOCYTES NFR BLD AUTO: 8 % (ref 2–9)
NEUTROPHILS # BLD AUTO: 3.7 X10^3/UL (ref 1.8–6.8)
NEUTROPHILS NFR BLD AUTO: 68 % (ref 42–75)
PLATELET # BLD AUTO: 190 X10^3/UL (ref 130–400)
PMV BLD AUTO: 8 FL (ref 7.4–10.4)
PROT SERPL-MCNC: 7.5 G/DL (ref 6.4–8.2)
PROTHROMBIN TIME: 10.5 SECONDS (ref 9.6–11.5)
RBC # BLD AUTO: 4.54 X10^6/UL (ref 4.38–5.82)

## 2021-08-26 PROCEDURE — 85610 PROTHROMBIN TIME: CPT

## 2021-08-26 PROCEDURE — 85025 COMPLETE CBC W/AUTO DIFF WBC: CPT

## 2021-08-26 PROCEDURE — 85730 THROMBOPLASTIN TIME PARTIAL: CPT

## 2021-08-26 PROCEDURE — 36415 COLL VENOUS BLD VENIPUNCTURE: CPT

## 2021-08-26 PROCEDURE — 80053 COMPREHEN METABOLIC PANEL: CPT

## 2021-08-26 PROCEDURE — 99283 EMERGENCY DEPT VISIT LOW MDM: CPT

## 2021-08-26 NOTE — NUR
"BLEEDING FROM MY WEENIE" HAD FU INSERTED TWO DAYS AGO. NOW HAS BLOODY URINE 
OUTPUT

irrigated w/out difficulty

patient already on flomax 

erp to bedside-plan to removed fu per patient's preference

Removed without difficulty